# Patient Record
Sex: MALE | Race: WHITE | NOT HISPANIC OR LATINO | URBAN - NONMETROPOLITAN AREA
[De-identification: names, ages, dates, MRNs, and addresses within clinical notes are randomized per-mention and may not be internally consistent; named-entity substitution may affect disease eponyms.]

---

## 2017-06-14 PROBLEM — H35.3222: Noted: 2017-06-14

## 2017-06-14 PROBLEM — H35.3211: Noted: 2017-06-14

## 2017-06-14 PROBLEM — Z96.1: Noted: 2017-06-14

## 2019-01-11 ENCOUNTER — IMPORTED ENCOUNTER (OUTPATIENT)
Dept: URBAN - NONMETROPOLITAN AREA CLINIC 1 | Facility: CLINIC | Age: 79
End: 2019-01-11

## 2019-01-11 PROCEDURE — 99213 OFFICE O/P EST LOW 20 MIN: CPT

## 2019-01-11 PROCEDURE — 92134 CPTRZ OPH DX IMG PST SGM RTA: CPT

## 2019-01-11 NOTE — PATIENT DISCUSSION
TERESSA-OCT MAC performed today and reviewed with patient OU stable and dry-no injection treatment necessary today continue monitor closely-follow up in 6 wks with OCT MACPC IOL OU-stable monitor9/15/17  IOP 15:16  Va cc OD 20/25- OS 20/20-10/13/17  IOP 14:15  Va cc OD 20/50 OS 20//10/17  IOP 16:16  Va cc OD 20/40 OS 20//15/17  IOP 15:15  Va cc OD 20/30+ OS 20/201/26/18  IOP 20:21  Va cc OD 20/30 OS 20/25 (no shot)3/9/18  IOP  20:18  Va cc OD 20/30 OS 20/25 (no shot)6/1/18  IOP 20:20    Va cc OD 02/25-2 OS 20/20 (no shot)8/3/18 iop 19:18 VA OD 20/25-2 OS 20/20-2 (no shot)10/22/18 IOP 17:174 Va cc OD 20/25-2 OS 20/20-2RTC: 4 wks OCT MAC & F/U EWELINA Miller's Notes: OCT MAC  01/11/19

## 2019-02-25 ENCOUNTER — IMPORTED ENCOUNTER (OUTPATIENT)
Dept: URBAN - NONMETROPOLITAN AREA CLINIC 1 | Facility: CLINIC | Age: 79
End: 2019-02-25

## 2019-02-25 PROCEDURE — 92134 CPTRZ OPH DX IMG PST SGM RTA: CPT

## 2019-02-25 PROCEDURE — 92014 COMPRE OPH EXAM EST PT 1/>: CPT

## 2019-02-25 NOTE — PATIENT DISCUSSION
ARMD OU -OCT MAC performed today and reviewed with patient OU stable and dry-no injection treatment necessary today continue monitor closely-follow up in 8 wks with OCT MACPC IOL OU-stable monitor9/15/17  IOP 15:16  Va cc OD 20/25- OS 20/20-10/13/17  IOP 14:15  Va cc OD 20/50 OS 20//10/17  IOP 16:16  Va cc OD 20/40 OS 20//15/17  IOP 15:15  Va cc OD 20/30+ OS 20/201/26/18  IOP 20:21  Va cc OD 20/30 OS 20/25 (no shot)3/9/18  IOP  20:18  Va cc OD 20/30 OS 20/25 (no shot)6/1/18  IOP 20:20    Va cc OD 02/25-2 OS 20/20 (no shot)8/3/18 iop 19:18 VA OD 20/25-2 OS 20/20-2 (no shot)10/22/18 IOP 17:174 Va cc OD 20/25-2 OS 20/20-22/25/19 IOP 19:16 Va cc OD 20/25 OS 20/20 (no shot) RTC: 8 wks OCT MAC & F/U TERESSA; 's Notes: OCT MAC  01/11/19

## 2019-04-29 ENCOUNTER — IMPORTED ENCOUNTER (OUTPATIENT)
Dept: URBAN - NONMETROPOLITAN AREA CLINIC 1 | Facility: CLINIC | Age: 79
End: 2019-04-29

## 2019-04-29 PROCEDURE — 92134 CPTRZ OPH DX IMG PST SGM RTA: CPT

## 2019-04-29 PROCEDURE — 92012 INTRM OPH EXAM EST PATIENT: CPT

## 2019-04-29 NOTE — PATIENT DISCUSSION
ARMD OU -OCT MAC performed today and reviewed with patient OU stable and dry-no injection treatment necessary today continue monitor closely-follow up in 8 wks with OCT MACPC IOL OU-stable monitor9/15/17  IOP 15:16  Va cc OD 20/25- OS 20/20-10/13/17  IOP 14:15  Va cc OD 20/50 OS 20//10/17  IOP 16:16  Va cc OD 20/40 OS 20//15/17  IOP 15:15  Va cc OD 20/30+ OS 20/201/26/18  IOP 20:21  Va cc OD 20/30 OS 20/25 (no shot)3/9/18  IOP  20:18  Va cc OD 20/30 OS 20/25 (no shot)6/1/18  IOP 20:20    Va cc OD 02/25-2 OS 20/20 (no shot)8/3/18 iop 19:18 VA OD 20/25-2 OS 20/20-2 (no shot)10/22/18 IOP 17:174 Va cc OD 20/25-2 OS 20/20-22/25/19 IOP 19:16 Va cc OD 20/25 OS 20/20 (no shot) RTC: 10 wks OCT MAC & F/U ARMD; 's Notes: OCT MAC  04/29/19

## 2019-07-19 ENCOUNTER — IMPORTED ENCOUNTER (OUTPATIENT)
Dept: URBAN - NONMETROPOLITAN AREA CLINIC 1 | Facility: CLINIC | Age: 79
End: 2019-07-19

## 2019-07-19 PROCEDURE — 92134 CPTRZ OPH DX IMG PST SGM RTA: CPT

## 2019-07-19 PROCEDURE — 99212 OFFICE O/P EST SF 10 MIN: CPT

## 2019-07-19 NOTE — PATIENT DISCUSSION
TERESSA OU -OCT MAC performed today and reviewed with patient OU stable and dry-no injection treatment necessary today continue monitor closely-Order OCT MAC PC IOL OU-stable monitorRTC: 8 wks OCT MAC & F/U TERESSA; 's Notes: OCT MAC  04/29/19

## 2019-10-11 ENCOUNTER — IMPORTED ENCOUNTER (OUTPATIENT)
Dept: URBAN - NONMETROPOLITAN AREA CLINIC 1 | Facility: CLINIC | Age: 79
End: 2019-10-11

## 2019-10-11 PROCEDURE — 99212 OFFICE O/P EST SF 10 MIN: CPT

## 2019-10-11 PROCEDURE — 92134 CPTRZ OPH DX IMG PST SGM RTA: CPT

## 2019-10-11 NOTE — PATIENT DISCUSSION
ARMD OU -OCT MAC performed today and reviewed with patient OU stable and dry-no injection treatment necessary today continue monitor closely-Advised pt 10 wks is the longest he should go w/out OCT MAC-Order OCT MAC PC IOL OU-stable monitorRTC 10 wk CEE OCT MAC

## 2019-12-20 ENCOUNTER — IMPORTED ENCOUNTER (OUTPATIENT)
Dept: URBAN - NONMETROPOLITAN AREA CLINIC 1 | Facility: CLINIC | Age: 79
End: 2019-12-20

## 2019-12-20 PROCEDURE — 92134 CPTRZ OPH DX IMG PST SGM RTA: CPT

## 2019-12-20 PROCEDURE — 99212 OFFICE O/P EST SF 10 MIN: CPT

## 2019-12-20 NOTE — PATIENT DISCUSSION
ARMD OU -OCT MAC performed today and reviewed with patient OU stable and dry-no injection treatment necessary today continue monitor closely-Advised pt 10 wks is the longest he should go w/out OCT MAC-Order OCT MAC PC IOL OU-stable monitorRTC 10 wk CEE OCT MAC pt deferred Dilation 12/20/19 pt has not been dilated since 02/17

## 2020-02-28 ENCOUNTER — IMPORTED ENCOUNTER (OUTPATIENT)
Dept: URBAN - NONMETROPOLITAN AREA CLINIC 1 | Facility: CLINIC | Age: 80
End: 2020-02-28

## 2020-02-28 PROCEDURE — 92134 CPTRZ OPH DX IMG PST SGM RTA: CPT

## 2020-02-28 PROCEDURE — 99213 OFFICE O/P EST LOW 20 MIN: CPT

## 2020-02-28 NOTE — PATIENT DISCUSSION
ARMD OU -OCT MAC performed today and reviewed with patient OU stable and dry-no injection treatment necessary today continue monitor closely-Advised pt 10 wks is the longest he should go w/out OCT MAC-Order OCT MAC PC IOL OU-stable monitorRTC 10 wk F/U AMD OCT MAC

## 2020-06-15 ENCOUNTER — IMPORTED ENCOUNTER (OUTPATIENT)
Dept: URBAN - NONMETROPOLITAN AREA CLINIC 1 | Facility: CLINIC | Age: 80
End: 2020-06-15

## 2020-06-15 PROCEDURE — 92012 INTRM OPH EXAM EST PATIENT: CPT

## 2020-06-15 NOTE — PATIENT DISCUSSION
ARMD OU -OCT MAC performed today and reviewed with patient OU stable and dry-no injection treatment necessary today continue monitor closely-Advised pt 10 wks is the longest he should go w/out OCT MAC-Order OCT MAC and rtc 6 week f/u -OCT mac done today and remains stablePC IOL OU-stable monitorRTC 6 wk F/U AMD OCT MAC

## 2020-08-31 ENCOUNTER — IMPORTED ENCOUNTER (OUTPATIENT)
Dept: URBAN - NONMETROPOLITAN AREA CLINIC 1 | Facility: CLINIC | Age: 80
End: 2020-08-31

## 2020-08-31 PROCEDURE — 92134 CPTRZ OPH DX IMG PST SGM RTA: CPT

## 2020-08-31 PROCEDURE — 92012 INTRM OPH EXAM EST PATIENT: CPT

## 2020-08-31 NOTE — PATIENT DISCUSSION
ARMD OU -OCT MAC performed today and reviewed with patient OU stable and dry-no injection treatment necessary today continue monitor closely-Advised pt 10 wks is the longest he should go w/out OCT MAC-Order OCT MAC PC IOL OU-stable monitorRTC 9 wk F/U AMD OCT MAC

## 2020-11-09 ENCOUNTER — IMPORTED ENCOUNTER (OUTPATIENT)
Dept: URBAN - NONMETROPOLITAN AREA CLINIC 1 | Facility: CLINIC | Age: 80
End: 2020-11-09

## 2020-11-09 PROCEDURE — 92012 INTRM OPH EXAM EST PATIENT: CPT

## 2020-11-09 PROCEDURE — 92134 CPTRZ OPH DX IMG PST SGM RTA: CPT

## 2021-01-25 ENCOUNTER — IMPORTED ENCOUNTER (OUTPATIENT)
Dept: URBAN - NONMETROPOLITAN AREA CLINIC 1 | Facility: CLINIC | Age: 81
End: 2021-01-25

## 2021-01-25 PROCEDURE — 92134 CPTRZ OPH DX IMG PST SGM RTA: CPT

## 2021-01-25 PROCEDURE — 92012 INTRM OPH EXAM EST PATIENT: CPT

## 2021-01-25 NOTE — PATIENT DISCUSSION
TERESSA OU Discussed findings w/pt todayOCT of MAC performed today and reviewed with patient OU stable and dryno injection treatment necessary today continue monitor closelyAdvised pt 10 wks is the longest he should go w/out OCT MACOrder OCT MACPC IOL OUstable monitorRTC 10 wk F/U AMD OCT MAC

## 2021-04-19 ENCOUNTER — IMPORTED ENCOUNTER (OUTPATIENT)
Dept: URBAN - NONMETROPOLITAN AREA CLINIC 1 | Facility: CLINIC | Age: 81
End: 2021-04-19

## 2021-04-19 PROCEDURE — 92012 INTRM OPH EXAM EST PATIENT: CPT

## 2021-04-19 PROCEDURE — 92134 CPTRZ OPH DX IMG PST SGM RTA: CPT

## 2021-04-19 NOTE — PATIENT DISCUSSION
TERESSA OU Discussed findings w/pt todayOCT of MAC performed today and reviewed with patientno injection treatment necessary today continue monitor closelyAdvised pt 10 wks is the longest he should go w/out OCT [address] refer to [facility] for continuing care PC IOL OUstable monitorRTC [facility] for continuing care Letter to Khushboo Elliott 04/19/21

## 2021-07-12 ENCOUNTER — IMPORTED ENCOUNTER (OUTPATIENT)
Dept: URBAN - NONMETROPOLITAN AREA CLINIC 1 | Facility: CLINIC | Age: 81
End: 2021-07-12

## 2021-07-12 PROCEDURE — 99213 OFFICE O/P EST LOW 20 MIN: CPT

## 2021-07-12 PROCEDURE — 92134 CPTRZ OPH DX IMG PST SGM RTA: CPT

## 2021-07-12 NOTE — PATIENT DISCUSSION
ARMD OU -  discussed findings w/ patient -  discussed signs and symptoms associated -  recommend eye vitamins daily and monitoring AG at home patient to call or come in ASAP if any changes in vision noted from today -  drusen and RPE changes noted OU -  hx of Avastin injections w/ Dr. 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

## 2021-11-12 ENCOUNTER — IMPORTED ENCOUNTER (OUTPATIENT)
Dept: URBAN - NONMETROPOLITAN AREA CLINIC 1 | Facility: CLINIC | Age: 81
End: 2021-11-12

## 2021-11-12 PROCEDURE — 92134 CPTRZ OPH DX IMG PST SGM RTA: CPT

## 2021-11-12 PROCEDURE — 99213 OFFICE O/P EST LOW 20 MIN: CPT

## 2021-11-12 NOTE — PATIENT DISCUSSION
ARMD OU h/o Avastin Injections OD-  discussed findings w/ patient -  discussed signs and symptoms associated -  recommend eye vitamins daily and monitoring AG at home patient to call or come in ASAP if any changes in vision noted from today -  drusen and RPE changes noted OU -  hx of Avastin injections w/ Dr. 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

## 2022-04-09 ASSESSMENT — TONOMETRY
OD_IOP_MMHG: 19
OD_IOP_MMHG: 21
OD_IOP_MMHG: 22
OD_IOP_MMHG: 19
OS_IOP_MMHG: 18
OD_IOP_MMHG: 20
OS_IOP_MMHG: 17
OD_IOP_MMHG: 21
OD_IOP_MMHG: 20
OD_IOP_MMHG: 20
OS_IOP_MMHG: 20
OD_IOP_MMHG: 17
OS_IOP_MMHG: 18
OS_IOP_MMHG: 16
OS_IOP_MMHG: 19
OD_IOP_MMHG: 18
OD_IOP_MMHG: 18
OD_IOP_MMHG: 21
OD_IOP_MMHG: 19
OS_IOP_MMHG: 20
OD_IOP_MMHG: 19
OS_IOP_MMHG: 16
OS_IOP_MMHG: 17
OS_IOP_MMHG: 17
OS_IOP_MMHG: 18
OS_IOP_MMHG: 18
OS_IOP_MMHG: 17
OS_IOP_MMHG: 19

## 2022-04-09 ASSESSMENT — VISUAL ACUITY
OS_SC: 20/25+4
OS_SC: 20/20
OU_SC: 20/20
OD_SC: 20/30+3
OS_SC: 20/20-2
OU_CC: J1+
OU_SC: 20/20
OD_SC: 20/30-2
OS_SC: 20/25
OD_PH: 20/30
OS_SC: 20/20
OS_SC: 20/25
OU_CC: 20/40
OD_SC: 20/25
OD_SC: 20/30
OS_SC: 20/25
OS_SC: 20/25
OD_SC: 20/30
OD_SC: 20/25
OD_SC: 20/30-1
OS_SC: 20/25
OS_SC: 20/20
OS_SC: 20/25+2
OD_PH: 20/25
OD_PH: 20/30+2
OD_SC: 20/30
OD_SC: 20/25-
OS_PH: 20/25
OD_SC: 20/25-2
OS_SC: 20/20
OU_CC: 20/20
OD_SC: 20/25
OD_SC: 20/30+2
OS_SC: 20/25
OD_PH: 20/30
OD_SC: 20/25

## 2022-09-14 ENCOUNTER — COMPREHENSIVE EXAM (OUTPATIENT)
Dept: URBAN - NONMETROPOLITAN AREA CLINIC 4 | Facility: CLINIC | Age: 82
End: 2022-09-14

## 2022-09-14 DIAGNOSIS — H35.3211: ICD-10-CM

## 2022-09-14 DIAGNOSIS — H35.3222: ICD-10-CM

## 2022-09-14 PROCEDURE — 92014 COMPRE OPH EXAM EST PT 1/>: CPT

## 2022-09-14 PROCEDURE — 92134 CPTRZ OPH DX IMG PST SGM RTA: CPT

## 2022-09-14 ASSESSMENT — VISUAL ACUITY
OD_CC: 20/25
OU_CC: 20/20
OU_CC: J1+
OS_CC: 20/25

## 2022-09-14 ASSESSMENT — TONOMETRY
OD_IOP_MMHG: 19
OS_IOP_MMHG: 20

## 2022-09-14 NOTE — PATIENT DISCUSSION
ARMD OU h/o Avastin Injections OD-  discussed findings w/ patient -  discussed signs and symptoms associated -  recommend eye vitamins daily and monitoring AG at home patient to call or come in ASAP if any changes in vision noted from today -  drusen and RPE changes noted OU -  hx of Avastin injections w/ . 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

## 2022-09-14 NOTE — PATIENT DISCUSSION
(Inactive CNV) Stable with inactive choroidal neovascularization.  Patient to keep appointments with [facility].